# Patient Record
Sex: FEMALE | Race: OTHER | HISPANIC OR LATINO | Employment: OTHER | URBAN - METROPOLITAN AREA
[De-identification: names, ages, dates, MRNs, and addresses within clinical notes are randomized per-mention and may not be internally consistent; named-entity substitution may affect disease eponyms.]

---

## 2022-08-31 ENCOUNTER — TELEPHONE (OUTPATIENT)
Dept: OBGYN CLINIC | Facility: HOSPITAL | Age: 82
End: 2022-08-31

## 2022-08-31 NOTE — TELEPHONE ENCOUNTER
Hello,  Please advise if the following patient can be forced onto the schedule:    Patient: Shanna Landon    :1940    MRN: [id number]    Call back #:[phone]    Insurance: [facility]  Patient called Insurance and is approved to see Dr Isi Muhammad NPI [id number]    Reason for appointment: Fracture R shoulder   ED  Will get disk from urgent care     Requested doctor/location: [place] / [place]     Patient will need   She will  have someone there with her to help with appointment   Thank you

## 2022-09-01 ENCOUNTER — TELEPHONE (OUTPATIENT)
Dept: OBGYN CLINIC | Facility: HOSPITAL | Age: 82
End: 2022-09-01

## 2022-09-01 NOTE — TELEPHONE ENCOUNTER
Patient called to check status, spoke to [facility]   Yosef Salazar Still waiting on response   Patient aware

## 2022-09-01 NOTE — TELEPHONE ENCOUNTER
line used to call patient  Left message to return phone call  Question about type of insurance and possible need for insurance referral for appointment  Then schedule NP appointment